# Patient Record
Sex: MALE | URBAN - METROPOLITAN AREA
[De-identification: names, ages, dates, MRNs, and addresses within clinical notes are randomized per-mention and may not be internally consistent; named-entity substitution may affect disease eponyms.]

---

## 2017-08-25 ENCOUNTER — THERAPY VISIT (OUTPATIENT)
Dept: CHIROPRACTIC MEDICINE | Facility: CLINIC | Age: 32
End: 2017-08-25
Payer: COMMERCIAL

## 2017-08-25 DIAGNOSIS — M99.05 SOMATIC DYSFUNCTION OF PELVIS REGION: Primary | ICD-10-CM

## 2017-08-25 DIAGNOSIS — M79.10 MYALGIA: ICD-10-CM

## 2017-08-25 DIAGNOSIS — M25.551 HIP PAIN, RIGHT: ICD-10-CM

## 2017-08-25 PROCEDURE — 99202 OFFICE O/P NEW SF 15 MIN: CPT | Performed by: CHIROPRACTOR

## 2017-08-25 ASSESSMENT — ACTIVITIES OF DAILY LIVING (ADL)
WALKING_INITIALLY: SLIGHT DIFFICULTY
TWISTING/PIVOTING_ON_INVOLVED_LEG: SLIGHT DIFFICULTY
GETTING_INTO_AND_OUT_OF_A_BATHTUB: EXTREME DIFFICULTY
LIGHT_TO_MODERATE_WORK: SLIGHT DIFFICULTY
DEEP_SQUATTING: EXTREME DIFFICULTY
STEPPING_UP_AND_DOWN_CURBS: MODERATE DIFFICULTY
HOS_ADL_SCORE(%): 57.35
GOING_DOWN_1_FLIGHT_OF_STAIRS: SLIGHT DIFFICULTY
HOS_ADL_ITEM_SCORE_TOTAL: 39
WALKING_UP_STEEP_HILLS: SLIGHT DIFFICULTY
WALKING_APPROXIMATELY_10_MINUTES: SLIGHT DIFFICULTY
HOS_ADL_HIGHEST_POTENTIAL_SCORE: 68
HOS_ADL_COUNT: 17
RECREATIONAL_ACTIVITIES: EXTREME DIFFICULTY
WALKING_15_MINUTES_OR_GREATER: MODERATE DIFFICULTY
WALKING_DOWN_STEEP_HILLS: SLIGHT DIFFICULTY
GOING_UP_1_FLIGHT_OF_STAIRS: SLIGHT DIFFICULTY
STANDING_FOR_15_MINUTES: SLIGHT DIFFICULTY
GETTING_INTO_AND_OUT_OF_AN_AVERAGE_CAR: MODERATE DIFFICULTY
ROLLING_OVER_IN_BED: MODERATE DIFFICULTY
HEAVY_WORK: EXTREME DIFFICULTY
PUTTING_ON_SOCKS_AND_SHOES: MODERATE DIFFICULTY
SITTING_FOR_15_MINUTES: MODERATE DIFFICULTY

## 2017-08-25 NOTE — MR AVS SNAPSHOT
"              After Visit Summary   2017    Austin Aviles    MRN: [id number]           Patient Information     Date Of Birth          1985        Visit Information        Provider Department      2017 4:00 PM Bam Sapp DC [facility] - [facility]        Today's Diagnoses     Somatic dysfunction of pelvis region    -  1    Myalgia        Hip pain, right           Follow-ups after your visit        Who to contact     If you have questions or need follow up information about today's clinic visit or your schedule please contact [facility] directly at [phone].  Normal or non-critical lab and imaging results will be communicated to you by [facility], letter or phone within 4 business days after the clinic has received the results. If you do not hear from us within 7 days, please contact the clinic through [facility] or phone. If you have a critical or abnormal lab result, we will notify you by phone as soon as possible.  Submit refill requests through [employer] or call your pharmacy and they will forward the refill request to us. Please allow 3 business days for your refill to be completed.          Additional Information About Your Visit        [facility] Information     [facility] lets you send messages to your doctor, view your test results, renew your prescriptions, schedule appointments and more. To sign up, go to [email or web address] . Click on \"Log in\" on the left side of the screen, which will take you to the Welcome page. Then click on \"Sign up Now\" on the right side of the page.     You will be asked to enter the access code listed below, as well as some personal information. Please follow the directions to create your username and password.     Your access code is: [email or web address]  Expires: 2017 10:24 PM     Your access code will  in 90 days. If you need help or a new code, please call your [facility] " clinic or [phone].        Care EveryWhere ID     This is your Care EveryWhere ID. This could be used by other organizations to access your [facility] medical records  [id number]         Blood Pressure from Last 3 Encounters:   No data found for BP    Weight from Last 3 Encounters:   05/21/09 83.9 kg (185 lb)              We Performed the Following     OFFICE/OUTPT VISIT,RICK JANE II        Primary Care Provider    None       No address on file        Equal Access to Services     EROS GUSTAFSON : Hadii aad ku hadasho Soomaali, waaxda luqadaha, qaybta kaalmada adeegyada, waxay idiin hayaan lisa marthasarina faust . So [facility] [phone].    ATENCIÓN: Si habla espfrandy, tiene a kulkarni disposición servicios gratuitos de asistencia lingüística. Llame al [phone].    We comply with applicable federal civil rights laws and [place] laws. We do not discriminate on the basis of race, color, national origin, age, disability sex, sexual orientation or gender identity.            Thank you!     Thank you for choosing [facility]  for your care. Our goal is always to provide you with excellent care. Hearing back from our patients is one way we can continue to improve our services. Please take a few minutes to complete the written survey that you may receive in the mail after your visit with us. Thank you!             Your Updated Medication List - Protect others around you: Learn how to safely use, store and throw away your medicines at www.disposemymeds.org.          This list is accurate as of: 8/25/17 11:59 PM.  Always use your most recent med list.                   Brand Name Dispense Instructions for use Diagnosis    EPIPEN 0.3 MG/0.3ML injection   Generic drug:  EPINEPHrine     1    1 TIME ONLY    Unspecified prophylactic or treatment measure       MALARONE 250-100 MG per tablet   Generic drug:  atovaquone-proguanil     22    PO QD: Start 1 days prior to trip through 7 days  after return for malaria prophylaxis    Unspecified prophylactic or treatment measure

## 2017-08-27 PROBLEM — M99.05 SOMATIC DYSFUNCTION OF PELVIS REGION: Status: ACTIVE | Noted: 2017-08-27

## 2017-08-27 PROBLEM — M79.10 MYALGIA: Status: ACTIVE | Noted: 2017-08-27

## 2017-08-27 PROBLEM — M25.551 HIP PAIN, RIGHT: Status: ACTIVE | Noted: 2017-08-27

## 2017-08-28 NOTE — PROGRESS NOTES
[facility]  Aug 25, 2017    Subjective:  Austin Aviles   32 year old   male    CC: hip pain R>L, JUDI, referral from Rey Watts   Medications reviewed: Denies, occasional NSAIDS to help with pain  Visit: 1/6  Goal: help manage pain in back and hips, decrease pain 50%, sit for 30 minutes without hip pain  Location: L posterior and lateral hip  JET: Notes chronic pain, former   Pain: varies but 7/10 with activity, does have some pain at night  Previous History: back pain and hip pain, denies any other significant trauma   Progression: not changing and has been getting worse over last year   Quality: ache   Radiation: Denies  Pain is worse with: sitting for long periods, running  Pain is better with: rest, stretching  Timing: frequent pain  Under care: Saw Dr. Kristina LOZANO for this and had x-ray and MRI. Was recommended to see Dr. Bowles. Sent info to MD in [place] and is scheduled to have surgery in October. He is also working with Radha BATEMAN for hip and it is going well. Notes that seems to be progressing.   Imaging: x-ray and MRI show CAM and labral tear   Social: Alert, oriented, and active.   Other: Multiple disc herniations in lumbar area      Objective:  Inspection:  No SDD  No Scars  Normal Gait    Palpation:  No specific pain  Palpable soreness over R posterior hip, lower back  Myofascitis 2/4 noted over R hip ER, R QL, B psoas     ROM:  Lumbar flexion   90/90, discomfort lower back  Lumbar extension  30/30, discomfort lower back  Right Hip IR  11/45  Left Hip IR  23/45  Right Hip ER  35/60  Left  hip ER  46/60    MMT:  Left glute med 4/5 with no pain  Right glute med 4/5 with no pain  Left TFL 5/5 with no pain  Right TFL 4/5 with no pain  Left piriformis 5/5 with no pain  Right piriformis 4/5 with no pain    MET:  Right short leg  Right superior pubic bone  Right hip out flare    Squat:  Shift to right  Foot flare to right  Arm drop on right    Lunge:  Right knee genu  valgum  Right knee cross over     NAL:  Restricted SI on right side    Neuro:  Able to toe walk and heel walk  L4-S1 light touch WNL    Ortho:  SLR (-), vahe and ginger (pain on right)    Assessment:  NAL with associated myofascitis and weakness  JUDI    Plan:   Decrease pain 50%    Restore symmetric hip IR   Restore symmetric hip ER   Strengthen hip stabilizers to 5/5 B   Restore pelvic leveling   Restore segmental motion   Functional goals in history    Patient was given detailed history, review of symptoms, examination, functional examination, and report of findings. Patients treatment plan with be 2 times per week for 2 weeks followed by 1 time per week for 2 weeks. Following treatment plan a follow up exam will be done to make sure patient is improving. Treatment frequency will degrease as patients subjective complaints improve as well as objective findings. Prognosis for care is good based on fact that patient is active and is willing to take active approach in care.     Patient tolerated treatment well today  Treatment Time: 45 minutes  83286 manipulation 1-2 segments: MET, Hip LAD  34294 Manual therapy: (ART, [place], Strain Counter Strain, Fascial Manipulation, Cupping) performed over area of R hip ER, R QL, B psoas, LPS  73935 therapeutic exercise (20 minutes):   Strapping: hip IR and lateral glide on right  Taping:  Next visit: 1 week  Other:  Did talk about how working with PT is most important. He asked a lot of question about surgery and I spent 20 minutes discussing this with him. I told him that this will more manage pain, but PT is most important to see if he does need surgery. He agreed to this and was very pleased with how this exam went.     Bam Sapp DC, MEd, ATC